# Patient Record
Sex: MALE | Race: WHITE | URBAN - METROPOLITAN AREA
[De-identification: names, ages, dates, MRNs, and addresses within clinical notes are randomized per-mention and may not be internally consistent; named-entity substitution may affect disease eponyms.]

---

## 2017-12-30 NOTE — OP NOTE
solutions placed on the eye. The eye was covered with a metal shield. The patient went to the PACU in excellent condition, having tolerated the procedure extremely well, and will follow up with me tomorrow for postop day 1 care.     Leda Kennedy MD, PhD, FACS

## 2018-01-04 ENCOUNTER — HOSPITAL ENCOUNTER (OUTPATIENT)
Dept: SURGERY | Age: 63
Discharge: HOME | End: 2018-01-04
Attending: OPHTHALMOLOGY | Admitting: OPHTHALMOLOGY

## 2018-01-04 VITALS
OXYGEN SATURATION: 100 % | HEIGHT: 66 IN | RESPIRATION RATE: 16 BRPM | DIASTOLIC BLOOD PRESSURE: 55 MMHG | WEIGHT: 163 LBS | BODY MASS INDEX: 26.2 KG/M2 | TEMPERATURE: 98 F | SYSTOLIC BLOOD PRESSURE: 104 MMHG | HEART RATE: 50 BPM

## 2018-01-04 RX ORDER — SODIUM CHLORIDE 0.9 % (FLUSH) 0.9 %
10 SYRINGE (ML) INJECTION PRN
Status: DISCONTINUED | OUTPATIENT
Start: 2018-01-04 | End: 2018-01-05 | Stop reason: HOSPADM

## 2018-01-04 RX ORDER — SODIUM CHLORIDE 0.9 % (FLUSH) 0.9 %
10 SYRINGE (ML) INJECTION EVERY 12 HOURS SCHEDULED
Status: DISCONTINUED | OUTPATIENT
Start: 2018-01-04 | End: 2018-01-05 | Stop reason: HOSPADM

## 2018-01-04 RX ORDER — LIDOCAINE HYDROCHLORIDE 10 MG/ML
1 INJECTION, SOLUTION EPIDURAL; INFILTRATION; INTRACAUDAL; PERINEURAL
Status: ACTIVE | OUTPATIENT
Start: 2018-01-04 | End: 2018-01-04

## 2018-01-04 RX ORDER — SODIUM CHLORIDE, SODIUM LACTATE, POTASSIUM CHLORIDE, CALCIUM CHLORIDE 600; 310; 30; 20 MG/100ML; MG/100ML; MG/100ML; MG/100ML
INJECTION, SOLUTION INTRAVENOUS CONTINUOUS
Status: DISCONTINUED | OUTPATIENT
Start: 2018-01-04 | End: 2018-01-05 | Stop reason: HOSPADM

## 2018-01-04 RX ADMIN — SODIUM CHLORIDE, SODIUM LACTATE, POTASSIUM CHLORIDE, CALCIUM CHLORIDE: 600; 310; 30; 20 INJECTION, SOLUTION INTRAVENOUS at 06:22

## 2018-01-04 ASSESSMENT — PAIN SCALES - GENERAL: PAINLEVEL_OUTOF10: 0

## 2018-01-04 ASSESSMENT — PAIN - FUNCTIONAL ASSESSMENT: PAIN_FUNCTIONAL_ASSESSMENT: 0-10

## 2018-01-04 NOTE — PROGRESS NOTES
Discharge instructions reviewed with patient/responsible adult and understanding verbalized. Discharge instructions signed and copies given. Patient discharged home with belongings. Eye kit and lens implant ID card given to pt.   Assist to car

## 2018-01-04 NOTE — ANESTHESIA PRE-OP
Department of Anesthesiology  Preprocedure Note       Name:  Crista Hernandez   Age:  65 y.o.  :  1955                                          MRN:  [id number]         Date:  2018      Surgeon:    Procedure:    Medications prior to admission:   Prior to Admission medications    Medication Sig Start Date End Date Taking? Authorizing Provider   UNABLE TO FIND Allergy injections   Yes Historical Provider, MD   Multiple Vitamins-Minerals (THERAPEUTIC MULTIVITAMIN-MINERALS) tablet Take 1 tablet by mouth daily   Yes Historical Provider, MD       Current medications:    Current Outpatient Prescriptions   Medication Sig Dispense Refill    UNABLE TO FIND Allergy injections      Multiple Vitamins-Minerals (THERAPEUTIC MULTIVITAMIN-MINERALS) tablet Take 1 tablet by mouth daily       Current Facility-Administered Medications   Medication Dose Route Frequency Provider Last Rate Last Dose    lactated ringers infusion   Intravenous Continuous Astrid Rose MD   Stopped at 18 0723    sodium chloride flush 0.9 % injection 10 mL  10 mL Intravenous 2 times per day Astrid Rose MD        sodium chloride flush 0.9 % injection 10 mL  10 mL Intravenous PRN Astrid Rose MD        lidocaine PF 1 % injection 1 mL  1 mL Intradermal Once PRN Astrid Rose MD        epinephrine and lidocaine 2% PF in BSS injection (1 mL)   Intraocular Once Bernard Francisco MD           Allergies:  No Known Allergies    Problem List:  There is no problem list on file for this patient.       Past Medical History:        Diagnosis Date    Asthma     hx of    Environmental allergies        Past Surgical History:        Procedure Laterality Date    CATARACT REMOVAL WITH IMPLANT Right 2018    COLONOSCOPY      COLONOSCOPY  2016    Diverticulosis       Social History:    Social History   Substance Use Topics    Smoking status: Never Smoker    Smokeless tobacco: Never Used    Alcohol use 0.6 - 1.2 oz/week

## 2018-01-13 NOTE — OP NOTE
Giovanni Munoz    OPERATIVE NOTE    Preoperative Diagnosis: Cataract left eye    Postoperative Diagnosis: Cataract left eye     Procedure: Phacoemulsification with intraocular lens inplantation, left eye    Surgeon: Janelle Mai M.D., Ph.D. Anesthesia: MAC with topical    Complications: none    Specimens: none    Indications for procedure: The patient is a 65y.o. year old with decreased vision, glare and halos around lights, and trouble with activities of daily living. Examination revealed a visually significant cataract in the left eye. Risks, benefits, and alternatives to surgery were discussed with the patient and the patient elected to proceed with phacoemulsification with lens implantation. Details of the procedure: Following informed consent, the patient was taken to the operating room and placed in the supine position. The eye was prepped and draped in the usual sterile fashion using aseptic technique for cataract surgery. Following topical tetracaine drops a side port incision was made in the inferotemporal cornea. The eye was filled with Trypan blue followed by balanced salt solution. The eye was filled with 1% non-preserved lidocaine. The eye was filled with viscoelastic and a 2.2 mm keratome blade was used to make a 3-plane clear corneal incision in the superotemporal cornea. The cystitome was used to make a tear in the anterior capsule and a Utrata forceps was used to make a complete curvilinear capsulorrhexis. The lens was hydrodissected and freely rotated. Phacoemulsification was performed. Irrigation/aspiration was used to remove all cortical material from the capsular bag. The eye was filled with viscoelastic and a foldable posterior chamber intraocular lens was injected into the capsular bag. Irrigation/aspiration was used to remove all excess viscoelastic. The eye was pressurized and the wounds were check for leaks and none were found.   The patient had Betadine and Alphagan solutions placed on the eye. The eye was covered with a metal shield. The patient went to the PACU in excellent condition, having tolerated the procedure extremely well, and will follow up with me tomorrow for postop day 1 care.     Paulett Sever, MD, PhD, FACS

## 2018-01-13 NOTE — H&P
The H&P was reviewed, the patient was examined, and no change has occurred in the patient's condition since the H&P was completed.     Margarita Lin MD, PhD, FACS

## 2018-01-18 ENCOUNTER — HOSPITAL ENCOUNTER (OUTPATIENT)
Dept: SURGERY | Age: 63
Discharge: HOME | End: 2018-01-18
Attending: OPHTHALMOLOGY | Admitting: OPHTHALMOLOGY

## 2018-01-18 VITALS
HEIGHT: 66 IN | RESPIRATION RATE: 16 BRPM | OXYGEN SATURATION: 97 % | SYSTOLIC BLOOD PRESSURE: 121 MMHG | TEMPERATURE: 97.5 F | DIASTOLIC BLOOD PRESSURE: 77 MMHG | HEART RATE: 52 BPM | BODY MASS INDEX: 26.2 KG/M2 | WEIGHT: 163 LBS

## 2018-01-18 RX ORDER — OXYCODONE HYDROCHLORIDE AND ACETAMINOPHEN 5; 325 MG/1; MG/1
2 TABLET ORAL PRN
Status: ACTIVE | OUTPATIENT
Start: 2018-01-18 | End: 2018-01-18

## 2018-01-18 RX ORDER — FENTANYL CITRATE 50 UG/ML
25 INJECTION, SOLUTION INTRAMUSCULAR; INTRAVENOUS EVERY 5 MIN PRN
Status: DISCONTINUED | OUTPATIENT
Start: 2018-01-18 | End: 2018-01-19 | Stop reason: HOSPADM

## 2018-01-18 RX ORDER — MEPERIDINE HYDROCHLORIDE 50 MG/ML
12.5 INJECTION INTRAMUSCULAR; INTRAVENOUS; SUBCUTANEOUS EVERY 5 MIN PRN
Status: DISCONTINUED | OUTPATIENT
Start: 2018-01-18 | End: 2018-01-19 | Stop reason: HOSPADM

## 2018-01-18 RX ORDER — LIDOCAINE HYDROCHLORIDE 10 MG/ML
1 INJECTION, SOLUTION EPIDURAL; INFILTRATION; INTRACAUDAL; PERINEURAL
Status: ACTIVE | OUTPATIENT
Start: 2018-01-18 | End: 2018-01-18

## 2018-01-18 RX ORDER — SODIUM CHLORIDE 0.9 % (FLUSH) 0.9 %
10 SYRINGE (ML) INJECTION PRN
Status: DISCONTINUED | OUTPATIENT
Start: 2018-01-18 | End: 2018-01-19 | Stop reason: HOSPADM

## 2018-01-18 RX ORDER — HYDRALAZINE HYDROCHLORIDE 20 MG/ML
5 INJECTION INTRAMUSCULAR; INTRAVENOUS EVERY 10 MIN PRN
Status: DISCONTINUED | OUTPATIENT
Start: 2018-01-18 | End: 2018-01-19 | Stop reason: HOSPADM

## 2018-01-18 RX ORDER — SODIUM CHLORIDE, SODIUM LACTATE, POTASSIUM CHLORIDE, CALCIUM CHLORIDE 600; 310; 30; 20 MG/100ML; MG/100ML; MG/100ML; MG/100ML
INJECTION, SOLUTION INTRAVENOUS CONTINUOUS
Status: DISCONTINUED | OUTPATIENT
Start: 2018-01-18 | End: 2018-01-19 | Stop reason: HOSPADM

## 2018-01-18 RX ORDER — SODIUM CHLORIDE 0.9 % (FLUSH) 0.9 %
10 SYRINGE (ML) INJECTION EVERY 12 HOURS SCHEDULED
Status: DISCONTINUED | OUTPATIENT
Start: 2018-01-18 | End: 2018-01-19 | Stop reason: HOSPADM

## 2018-01-18 RX ORDER — OXYCODONE HYDROCHLORIDE AND ACETAMINOPHEN 5; 325 MG/1; MG/1
1 TABLET ORAL PRN
Status: ACTIVE | OUTPATIENT
Start: 2018-01-18 | End: 2018-01-18

## 2018-01-18 RX ORDER — ONDANSETRON 2 MG/ML
4 INJECTION INTRAMUSCULAR; INTRAVENOUS
Status: ACTIVE | OUTPATIENT
Start: 2018-01-18 | End: 2018-01-18

## 2018-01-18 ASSESSMENT — PAIN SCALES - GENERAL: PAINLEVEL_OUTOF10: 0

## 2018-01-18 NOTE — ANESTHESIA PRE-OP
1 - 2 Glasses of wine per week      Comment: once a week    Drug use: No     The medical history was obtained from the patient & the medical records. The nursing notes, primary physician's notes, and old charts (if applicable) were reviewed. Physical Examination    Airway    Mallampati: II    Dentition: intact    Mouth Opening: adequate    Prominent Incisors: no    Thyromental Distance: >3 cm    Head & Neck Range of Motion: full      Anesthetic Plan     ASA Status: 2 Anesthesia Type: MAC   Difficult Airway: No PONV History: No   Full Stomach: No Nerve Block: none   Emergent: No Notes: The anesthetic plan was discussed with the patient and/or patient representative. All questions were answered. Risks and possible complications were explained. Complications include, but are not limited to, sore throat, dental injury or damage, trauma to soft tissue, eye injury, corneal abrasion, adverse reaction to blood products and/or medications, bleeding, nerve injury, cardiac or respiratory arrest, and death. The patient and patients family understands these complications and are agreeable to the anesthetic plan. Nursing staff present in room during this discussion and consent. Informed consent obtained verbally from the patient.     Eleazar Rodriguez MD

## 2018-01-18 NOTE — ANESTHESIA POST-OP
Anesthesia Post-op Note    Patient Name: Rusty Alfonso YOB: 1955   Sex:  Male Age: 65 yrs     Medical Record Number: [id number] Acct Number: [de-identified]   Room Number: Room/bed info not found Hospital Day:       Date of Procedure: 01/18/18      Preoperative Diagnosis: CATARACT LEFT  EYE NUCLEAR    Procedure: Left PHACO with IOL    Surgeon: Radha Clark    Anesthesia type: MAC    Post-op assessment:  Anesthetic Problems: no   Last Vitals:  BP: 123/71Pulse: 50 Resp: 16SpO2: 98 Temp: 97 °F (36.1 °C) Height: 5' 6\" (1.676 m)  (01/18/18)Weight: 163 lb (73.9 kg)  (01/18/18) BMI: 26.4   Cardiovascular System Stable: yes  Respiratory Function: Airway Patent yes  ETT no  Ventilator no  Level of consciousness: awake, alert and oriented  Post-op pain: adequate analgesia  Hydration Adequate: yes  Nausea/Vomiting:no  Other Issues:     Jhonny Martínez MD